# Patient Record
Sex: MALE | URBAN - METROPOLITAN AREA
[De-identification: names, ages, dates, MRNs, and addresses within clinical notes are randomized per-mention and may not be internally consistent; named-entity substitution may affect disease eponyms.]

---

## 2024-02-02 ENCOUNTER — TELEPHONE (OUTPATIENT)
Dept: PSYCHIATRY | Facility: CLINIC | Age: 29
End: 2024-02-02

## 2024-02-02 NOTE — TELEPHONE ENCOUNTER
Patient has been added to the Medication Management wait list without a referral.    Insurance: Community care  Insurance Type:    Commercial []   Medicaid [x]   [facility] (if applicable)   Medicare []  Location Preference: [place]  Provider Preference: any  Virtual: Yes [] No [x]  Were outside resources sent: Yes [] No [x]

## 2024-02-02 NOTE — TELEPHONE ENCOUNTER
Writer rec a call from pts mother regarding services for Alcohol/Mental issues. Please call mom back. Writer rec a verbal from pt.

## 2024-09-16 ENCOUNTER — TELEPHONE (OUTPATIENT)
Age: 29
End: 2024-09-16

## 2024-09-16 NOTE — TELEPHONE ENCOUNTER
Pt mom's returned call in regards to previous writer's call. Pt mom listed with verbal consent to speak to and communication consent signed in reg. Pt mom stated they will discuss it with pt and give office a call back. Writer stated they understood and do not hear back within the week, we will assume services are no longer needed.     Pt taken off of MM wait list in the mean time.

## 2024-09-16 NOTE — TELEPHONE ENCOUNTER
Contacted patient off of Medication Management  to verify needs of services in attempts to offer patient an appointment. LVM for patient to contact intake dept  in regards to wait list.